# Patient Record
Sex: MALE | Race: OTHER | HISPANIC OR LATINO | URBAN - METROPOLITAN AREA
[De-identification: names, ages, dates, MRNs, and addresses within clinical notes are randomized per-mention and may not be internally consistent; named-entity substitution may affect disease eponyms.]

---

## 2022-07-09 ENCOUNTER — TELEPHONE ENCOUNTER (OUTPATIENT)
Dept: URBAN - METROPOLITAN AREA CLINIC 121 | Facility: CLINIC | Age: 67
End: 2022-07-09

## 2022-07-09 RX ORDER — LISINOPRIL 20 MG/1
TABLET ORAL ONCE A DAY
Refills: 0 | OUTPATIENT
Start: 2016-05-02 | End: 2016-05-02

## 2022-07-10 ENCOUNTER — TELEPHONE ENCOUNTER (OUTPATIENT)
Dept: URBAN - METROPOLITAN AREA CLINIC 121 | Facility: CLINIC | Age: 67
End: 2022-07-10

## 2022-07-10 RX ORDER — LISINOPRIL AND HYDROCHLOROTHIAZIDE TABLETS 20; 25 MG/1; MG/1
TABLET ORAL ONCE A DAY
Refills: 0 | Status: ACTIVE | COMMUNITY
Start: 2016-05-02

## 2022-07-10 RX ORDER — AMLODIPINE BESYLATE 10 MG/1
TABLET ORAL ONCE A DAY
Refills: 0 | Status: ACTIVE | COMMUNITY
Start: 2016-05-02

## 2024-10-15 ENCOUNTER — DASHBOARD ENCOUNTERS (OUTPATIENT)
Age: 69
End: 2024-10-15

## 2024-10-15 ENCOUNTER — OFFICE VISIT (OUTPATIENT)
Dept: URBAN - METROPOLITAN AREA CLINIC 63 | Facility: CLINIC | Age: 69
End: 2024-10-15
Payer: COMMERCIAL

## 2024-10-15 VITALS
OXYGEN SATURATION: 98 % | TEMPERATURE: 98.4 F | WEIGHT: 163 LBS | DIASTOLIC BLOOD PRESSURE: 100 MMHG | HEART RATE: 57 BPM | HEIGHT: 73 IN | SYSTOLIC BLOOD PRESSURE: 160 MMHG | BODY MASS INDEX: 21.6 KG/M2

## 2024-10-15 DIAGNOSIS — Z12.11 COLON CANCER SCREENING: ICD-10-CM

## 2024-10-15 PROCEDURE — 99203 OFFICE O/P NEW LOW 30 MIN: CPT

## 2024-10-15 RX ORDER — AMLODIPINE BESYLATE 10 MG/1
TAKE ONE TABLET BY MOUTH DAILY TABLET ORAL
Qty: 90 EACH | Refills: 0 | Status: ACTIVE | COMMUNITY

## 2024-10-15 RX ORDER — LABETALOL HYDROCHLORIDE 300 MG/1
TAKE 1 TABLET BY MOUTH TWICE DAILY TABLET ORAL
Qty: 180 EACH | Refills: 0 | Status: ACTIVE | COMMUNITY

## 2024-10-15 RX ORDER — LISINOPRIL AND HYDROCHLOROTHIAZIDE 25; 20 MG/1; MG/1
TAKE 1 TABLET BY MOUTH TWICE DAILY TABLET ORAL
Qty: 180 EACH | Refills: 0 | Status: ACTIVE | COMMUNITY

## 2024-10-15 NOTE — HPI-TODAY'S VISIT:
This is a very pleasant 68-year-old male who presents to the office with a chief complaint of colonoscopy screening.  Past medical history is significant for hypertension.  Past surgical history is significant for colonoscopy.  Family history is significant for maternal colorectal cancer, colorectal cancer in maternal grandmother, and paternal uncle with colorectal cancer. Last colonoscopy 5/12/2016, Dr. Espitia, 5-year recall Patient is endoscopy naive. Cardiologist: none. . Patient was last seen in the office on 5/10/2016 with Dr. Espitia for a colon cancer screening.  At that visit patient was doing well and was without GI complaint.  At that visit, he explained his last colonoscopy was in 2008 in [place]. . Patient presents today with his daughter for a surveillance colonoscopy.  Last colonoscopy was in 2016 and due to a strong family history of CRC patient has a 5-year recall.  Patient is otherwise doing well and is without GI complaint.  Ordered patient's surveillance colonoscopy today. . Patient denies abdominal pain, belching, bloating, constipation, diarrhea, dysphagia, pyrosis, melena, hematochezia, hematemesis, nausea, vomiting, BRBPR, and unintentional weight loss.

## 2024-10-15 NOTE — HPI-PREVIOUS LABS
Labs dated 8/7/2024 Thyroid panel - Within normal limits . CBC - WBCs 3.1 - Hemoglobin 12.8 - Rest within normal limits . CMP - Glucose 102 - Rest within normal limits . Lipid panel - Within normal limits . Hemoglobin A1c - 5.5

## 2024-10-15 NOTE — HPI-PREVIOUS PROCEDURES
Colonoscopy, 5/12/2016, Dr. Espitia - 1 diminutive polyp in the rectosigmoid colon, resected and retrieved. - Internal hemorrhoids. . Colonoscopy pathology report, 5/12/2016 - Sigmoid colon polyp; hyperplastic polyp.

## 2024-10-22 ENCOUNTER — LAB OUTSIDE AN ENCOUNTER (OUTPATIENT)
Dept: URBAN - METROPOLITAN AREA CLINIC 63 | Facility: CLINIC | Age: 69
End: 2024-10-22

## 2025-01-24 ENCOUNTER — OFFICE VISIT (OUTPATIENT)
Dept: URBAN - METROPOLITAN AREA SURGERY CENTER 4 | Facility: SURGERY CENTER | Age: 70
End: 2025-01-24
Payer: COMMERCIAL

## 2025-01-24 ENCOUNTER — CLAIMS CREATED FROM THE CLAIM WINDOW (OUTPATIENT)
Dept: URBAN - METROPOLITAN AREA CLINIC 4 | Facility: CLINIC | Age: 70
End: 2025-01-24
Payer: COMMERCIAL

## 2025-01-24 DIAGNOSIS — Z12.11 ENCOUNTER FOR SCREENING FOR MALIGNANT NEOPLASM OF COLON: ICD-10-CM

## 2025-01-24 DIAGNOSIS — K63.89 OTHER SPECIFIED DISEASES OF INTESTINE: ICD-10-CM

## 2025-01-24 DIAGNOSIS — Z80.0 FAMILY HISTORY OF MALIGNANT NEOPLASM OF DIGESTIVE ORGANS: ICD-10-CM

## 2025-01-24 DIAGNOSIS — D12.2 ADENOMA OF ASCENDING COLON: ICD-10-CM

## 2025-01-24 DIAGNOSIS — Z80.0 FAMILY HISTORY OF COLON CANCER: ICD-10-CM

## 2025-01-24 DIAGNOSIS — K64.1 SECOND DEGREE HEMORRHOIDS: ICD-10-CM

## 2025-01-24 DIAGNOSIS — K57.30 DIVERTICULOSIS OF LARGE INTESTINE WITHOUT PERFORATION OR ABSCESS WITHOUT BLEEDING: ICD-10-CM

## 2025-01-24 DIAGNOSIS — K63.5 POLYP OF ASCENDING COLON, UNSPECIFIED TYPE: ICD-10-CM

## 2025-01-24 PROCEDURE — 88305 TISSUE EXAM BY PATHOLOGIST: CPT | Performed by: PATHOLOGY

## 2025-01-24 PROCEDURE — 0528F RCMND FLW-UP 10 YRS DOCD: CPT | Performed by: INTERNAL MEDICINE

## 2025-01-24 PROCEDURE — 45385 COLONOSCOPY W/LESION REMOVAL: CPT | Performed by: INTERNAL MEDICINE

## 2025-01-24 PROCEDURE — 00811 ANES LWR INTST NDSC NOS: CPT | Performed by: NURSE ANESTHETIST, CERTIFIED REGISTERED

## 2025-01-24 RX ORDER — LABETALOL HYDROCHLORIDE 300 MG/1
TAKE 1 TABLET BY MOUTH TWICE DAILY TABLET ORAL
Qty: 180 EACH | Refills: 0 | Status: ACTIVE | COMMUNITY

## 2025-01-24 RX ORDER — LISINOPRIL AND HYDROCHLOROTHIAZIDE 25; 20 MG/1; MG/1
TAKE 1 TABLET BY MOUTH TWICE DAILY TABLET ORAL
Qty: 180 EACH | Refills: 0 | Status: ACTIVE | COMMUNITY

## 2025-01-24 RX ORDER — AMLODIPINE BESYLATE 10 MG/1
TAKE ONE TABLET BY MOUTH DAILY TABLET ORAL
Qty: 90 EACH | Refills: 0 | Status: ACTIVE | COMMUNITY

## 2025-02-07 ENCOUNTER — OFFICE VISIT (OUTPATIENT)
Dept: URBAN - METROPOLITAN AREA CLINIC 63 | Facility: CLINIC | Age: 70
End: 2025-02-07
Payer: COMMERCIAL

## 2025-02-07 VITALS
DIASTOLIC BLOOD PRESSURE: 80 MMHG | SYSTOLIC BLOOD PRESSURE: 130 MMHG | TEMPERATURE: 97.5 F | OXYGEN SATURATION: 98 % | BODY MASS INDEX: 22.13 KG/M2 | HEIGHT: 73 IN | WEIGHT: 167 LBS | HEART RATE: 73 BPM

## 2025-02-07 DIAGNOSIS — K64.8 INTERNAL HEMORRHOIDS: ICD-10-CM

## 2025-02-07 DIAGNOSIS — K57.90 DIVERTICULOSIS: ICD-10-CM

## 2025-02-07 PROBLEM — 397881000: Status: ACTIVE | Noted: 2025-02-07

## 2025-02-07 PROCEDURE — 99214 OFFICE O/P EST MOD 30 MIN: CPT

## 2025-02-07 RX ORDER — LABETALOL HYDROCHLORIDE 300 MG/1
TAKE 1 TABLET BY MOUTH TWICE DAILY TABLET ORAL
Qty: 180 EACH | Refills: 0 | Status: ACTIVE | COMMUNITY

## 2025-02-07 RX ORDER — LISINOPRIL AND HYDROCHLOROTHIAZIDE 25; 20 MG/1; MG/1
TAKE 1 TABLET BY MOUTH TWICE DAILY TABLET ORAL
Qty: 180 EACH | Refills: 0 | Status: ACTIVE | COMMUNITY

## 2025-02-07 RX ORDER — AMLODIPINE BESYLATE 10 MG/1
TAKE ONE TABLET BY MOUTH DAILY TABLET ORAL
Qty: 90 EACH | Refills: 0 | Status: ACTIVE | COMMUNITY

## 2025-02-07 NOTE — HPI-TODAY'S VISIT:
This is a very pleasant 68-year-old male who presents to the office with a chief complaint of "CRC screening F/U".  Past medical history is significant for hypertension.  Past surgical history is significant for colonoscopy.  Family history is significant for maternal colorectal cancer, colorectal cancer in maternal grandmother, and paternal uncle with colorectal cancer. Last colonoscopy 5/12/2016, Dr. Espitia, 5-year recall Patient is endoscopy naive. Cardiologist: none. . Patient was last seen in the office on 10/15/2024 for colon cancer screening.  At that visit, patient presented with his daughter for surveillance colonoscopy, he has a strong family history of CRC and had been given a 5-year recall, and was overdue.  Ordered at last visit. . Patient presents today within an  on the tablet for a follow-up of colonoscopy.  I he explains he tolerated the procedure well without complications.  I reviewed patient's colonoscopy op and pathology report in detail and answered all questions.  I reviewed the diagnosis of diverticulosis, a patient education handout on diverticulitis for patient to review, I explained that he had 1 small polyp seen but due to his family history he was given a 5-year recall.  Otherwise patient explained he was doing well and had no other GI complaints at this time.  He will follow-up in 5 years or sooner as needed for GI complaints. . Patient denies abdominal pain, belching, bloating, constipation, diarrhea, dysphagia, pyrosis, melena, hematochezia, hematemesis, nausea, vomiting, BRBPR, and unintentional weight loss.